# Patient Record
Sex: MALE | Race: WHITE | NOT HISPANIC OR LATINO | Employment: STUDENT | URBAN - METROPOLITAN AREA
[De-identification: names, ages, dates, MRNs, and addresses within clinical notes are randomized per-mention and may not be internally consistent; named-entity substitution may affect disease eponyms.]

---
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
## 2022-02-07 ENCOUNTER — OFFICE VISIT (OUTPATIENT)
Dept: ORTHOPEDIC SURGERY | Facility: CLINIC | Age: 22
End: 2022-02-07

## 2022-02-07 VITALS — WEIGHT: 138 LBS | HEIGHT: 70 IN | BODY MASS INDEX: 19.76 KG/M2 | TEMPERATURE: 97.1 F

## 2022-02-07 DIAGNOSIS — R52 PAIN: ICD-10-CM

## 2022-02-07 DIAGNOSIS — S52.124A CLOSED NONDISPLACED FRACTURE OF HEAD OF RIGHT RADIUS, INITIAL ENCOUNTER: Primary | ICD-10-CM

## 2022-02-07 PROCEDURE — 73070 X-RAY EXAM OF ELBOW: CPT | Performed by: ORTHOPAEDIC SURGERY

## 2022-02-07 PROCEDURE — 99204 OFFICE O/P NEW MOD 45 MIN: CPT | Performed by: ORTHOPAEDIC SURGERY

## 2022-02-07 NOTE — PROGRESS NOTES
New Elbow      Patient: Evangelist Howe        YOB: 2000        Chief Complaints: Elbow pain left      History of Present Illness:  This is a 21-year-old right-hand-dominant young man who injured his left elbow snowboarding at [employer] on Saturday.  He was seen in the ER there was placed in a splint told he had a fracture asked to follow-up with us.  No prior history of similar symptoms pain is significant but better in the splint no prior history of similar symptoms he currently works at [employer]        Allergies: No Known Allergies    Medications:   Home Medications:  Current Outpatient Medications on File Prior to Visit   Medication Sig   • ibuprofen (ADVIL,MOTRIN) 400 MG tablet Take 400 mg by mouth.     No current facility-administered medications on file prior to visit.     Current Medications:  Scheduled Meds:  Continuous Infusions:No current facility-administered medications for this visit.    PRN Meds:.    Past Medical History:   Diagnosis Date   • Aortic valve, bicuspid    • Heart murmur         Past Surgical History:   Procedure Laterality Date   • ADENOIDECTOMY     • APPENDECTOMY     • EAR TUBES     • HERNIA REPAIR          Social History     Occupational History   • Not on file   Tobacco Use   • Smoking status: Never Smoker   • Smokeless tobacco: Never Used   Substance and Sexual Activity   • Alcohol use: Defer   • Drug use: Defer   • Sexual activity: Defer      Social History     Social History Narrative   • Not on file      No family history on file.          Review of Systems: 14 point review of systems more for the elbow pain only the remainder negative per the patient  Review of Systems      Physical Exam: 21 y.o. male  General Appearance:    Alert, cooperative, in no acute distress                 There were no vitals filed for this visit.   Patient is alert and read ×3 no acute distress appears her above-listed at height weight and age.  Affect is normal respiratory rate is  normal unlabored. Heart rate regular rate rhythm, sclera, dentition and hearing are normal for the purpose of this exam.        Ortho Exam exam the left elbow he does have some swelling around there is palpable tenderness laterally he is pain with extension only gets to about -20 degrees of extension flexion is to 90           Radiology:   AP, Lateral of the left elbow were ordered/reviewed to evaluate pain.  I have compared to the views that they did at the hospital which were bit odd he does have an intra-articular effusion with a sail sign has appears he appears to have a nondisplaced radial neck fracture growth plates are closed      Assessment/Plan:  Left radial neck fracture this is nondisplaced he should do fine I will take him out of his splint but put him in a sling after we can start some gentle range of motion no lifting pushing or pulling I will see back 2 weeks with an x-ray he will be unable to do his job at this time

## 2022-02-24 ENCOUNTER — OFFICE VISIT (OUTPATIENT)
Dept: ORTHOPEDIC SURGERY | Facility: CLINIC | Age: 22
End: 2022-02-24

## 2022-02-24 VITALS — RESPIRATION RATE: 18 BRPM | HEIGHT: 70 IN | WEIGHT: 138.1 LBS | BODY MASS INDEX: 19.77 KG/M2 | TEMPERATURE: 97.1 F

## 2022-02-24 DIAGNOSIS — S52.124A CLOSED NONDISPLACED FRACTURE OF HEAD OF RIGHT RADIUS, INITIAL ENCOUNTER: Primary | ICD-10-CM

## 2022-02-24 PROCEDURE — 73070 X-RAY EXAM OF ELBOW: CPT | Performed by: ORTHOPAEDIC SURGERY

## 2022-02-24 PROCEDURE — 99212 OFFICE O/P EST SF 10 MIN: CPT | Performed by: ORTHOPAEDIC SURGERY

## 2022-02-24 NOTE — PROGRESS NOTES
Patient: Evangelist Howe  YOB: 2000  Date of Service: 2/24/2022    Chief Complaints: Left    Subjective:    History of Present Illness: Pt is seen in the office today with complaints of .  left elbow he is status post a snowboarding injury and had a nondisplaced radial neck fracture is been in a sling states her symptoms are much better      Allergies: No Known Allergies    Medications:   Home Medications:  Current Outpatient Medications on File Prior to Visit   Medication Sig   • ibuprofen (ADVIL,MOTRIN) 400 MG tablet Take 400 mg by mouth.     No current facility-administered medications on file prior to visit.     Current Medications:  Scheduled Meds:  Continuous Infusions:No current facility-administered medications for this visit.    PRN Meds:.    I have reviewed the patient's medical history in detail and updated the computerized patient record.  Review and summarization of old records include:    Past Medical History:   Diagnosis Date   • Aortic valve, bicuspid    • Heart murmur         Past Surgical History:   Procedure Laterality Date   • ADENOIDECTOMY     • APPENDECTOMY     • EAR TUBES     • HERNIA REPAIR          Social History     Occupational History   • Not on file   Tobacco Use   • Smoking status: Never Smoker   • Smokeless tobacco: Never Used   Substance and Sexual Activity   • Alcohol use: Defer   • Drug use: Defer   • Sexual activity: Defer      Social History     Social History Narrative   • Not on file      No family history on file.    ROS: 14 point review of systems was performed and was negative except for documented findings in HPI and today's encounter.     Allergies: No Known Allergies  Constitutional:  Denies fever, shaking or chills   Eyes:  Denies change in visual acuity   HENT:  Denies nasal congestion or sore throat   Respiratory:  Denies cough or shortness of breath   Cardiovascular:  Denies chest pain or severe LE edema   GI:  Denies abdominal pain, nausea, vomiting,  bloody stools or diarrhea   Musculoskeletal:  Numbness, tingling, or loss of motor function only as noted above in history of present illness.  : Denies painful urination or hematuria  Integument:  Denies rash, lesion or ulceration   Neurologic:  Denies headache or focal weakness  Endocrine:  Denies lymphadenopathy  Psych:  Denies confusion or change in mental status   Hem:  Denies active bleeding      Physical Exam: 21 y.o. male  Wt Readings from Last 3 Encounters:   02/07/22 62.6 kg (138 lb) (25 %, Z= -0.68)*   07/19/20 61.2 kg (135 lb) (31 %, Z= -0.50)*   08/08/17 50.2 kg (110 lb 9.6 oz) (32 %, Z= -0.46)*     * Growth percentiles are based on CDC (Boys, 2-20 Years) data.       There is no height or weight on file to calculate BMI.  No height and weight on file for this encounter.  There were no vitals filed for this visit.  Vital signs reviewed.   General Appearance:    Alert, cooperative, in no acute distress                    Ortho exam  Exam of his left elbow he has no effusion no redness he has mild loss of extension of about 8 degrees       ap/lat l elbow: These x-rays were taken to evaluate his fracture and compared to previous films he does appear to have a nondisplaced radial neck fracture is in perfect alignment  .time    Assessment: Left radial neck fracture    Plan: Plan is to start active range of motion no lifting pushing or pulling I will see him back in 3 weeks with 1 last x-ray I will keep him off work at least for 3 more weeks  Follow up as indicated.  Ice, elevate, and rest as needed.  Discussed conservative measures of pain control including ice, bracing.  Also talked about the importance of strengthening and maintaining ideal body weight    Odette Schaffer M.D.

## 2022-03-15 NOTE — PROGRESS NOTES
Elbow Follow Up      Patient: Evangelist Howe        YOB: 2000            Chief Complaints: elbow pain left      History of Present Illness: Patient is here for follow-up of the left radial head fracture he states he is doing great he is actually returned to work not having any issues at all      Physical Exam: 21 y.o. male  General Appearance:    Alert, cooperative, in no acute distress                 There were no vitals filed for this visit.     Patient is alert and read ×3 no acute distress appears her above-listed at height weight and age.  Affect is normal respiratory rate is normal unlabored. Heart rate regular rate rhythm, sclera, dentition and hearing are normal for the purpose of this exam.        Ortho Exam exam of his left elbow he has full range of motion no palpable tenderness    X-rays AP and lateral left elbow were taken to evaluate his fracture and compared to previous films he has a healing/healed radial head fracture that is essentially nondisplaced    Procedures          Assessment/Plan: Left radial head fracture that is healing just fine he is asymptomatic he will continue progression of his activities as long as he is doing well he can follow-up as needed

## 2022-03-17 ENCOUNTER — OFFICE VISIT (OUTPATIENT)
Dept: ORTHOPEDIC SURGERY | Facility: CLINIC | Age: 22
End: 2022-03-17

## 2022-03-17 VITALS — BODY MASS INDEX: 19.33 KG/M2 | HEIGHT: 70 IN | WEIGHT: 135 LBS | TEMPERATURE: 97.5 F

## 2022-03-17 DIAGNOSIS — M25.522 LEFT ELBOW PAIN: Primary | ICD-10-CM

## 2022-03-17 PROCEDURE — 99212 OFFICE O/P EST SF 10 MIN: CPT | Performed by: ORTHOPAEDIC SURGERY

## 2022-03-17 PROCEDURE — 73070 X-RAY EXAM OF ELBOW: CPT | Performed by: ORTHOPAEDIC SURGERY

## 2022-04-12 ENCOUNTER — TELEPHONE (OUTPATIENT)
Dept: ORTHOPEDIC SURGERY | Facility: CLINIC | Age: 22
End: 2022-04-12

## 2022-04-12 NOTE — TELEPHONE ENCOUNTER
Patient came in today wanting records or a letter from you stating he is good to go or can go ahead and start the .    I will mail out if I get the okay from you.    Please advise.    Best # for patient is [phone]

## 2025-01-19 ENCOUNTER — HOSPITAL ENCOUNTER (OUTPATIENT)
Facility: HOSPITAL | Age: 25
Discharge: HOME | End: 2025-01-19
Attending: EMERGENCY MEDICINE | Admitting: EMERGENCY MEDICINE
Payer: OTHER GOVERNMENT

## 2025-01-19 ENCOUNTER — APPOINTMENT (OUTPATIENT)
Dept: GENERAL RADIOLOGY | Facility: HOSPITAL | Age: 25
End: 2025-01-19
Payer: OTHER GOVERNMENT

## 2025-01-19 VITALS
BODY MASS INDEX: 21.47 KG/M2 | OXYGEN SATURATION: 100 % | WEIGHT: 150 LBS | HEART RATE: 86 BPM | TEMPERATURE: 97.9 F | SYSTOLIC BLOOD PRESSURE: 152 MMHG | DIASTOLIC BLOOD PRESSURE: 69 MMHG | RESPIRATION RATE: 18 BRPM | HEIGHT: 70 IN

## 2025-01-19 DIAGNOSIS — S09.92XA NOSE INJURY, INITIAL ENCOUNTER: Primary | ICD-10-CM

## 2025-01-19 PROCEDURE — 70160 X-RAY EXAM OF NASAL BONES: CPT

## 2025-01-19 PROCEDURE — G0463 HOSPITAL OUTPT CLINIC VISIT: HCPCS

## 2025-01-19 NOTE — DISCHARGE INSTRUCTIONS
Recommend regular ice and ibuprofen for swelling and pain.  May alternate with Tylenol as needed.  Continue to monitor symptoms, and return to emergency department for worsening symptoms or other medical emergencies.  Recommended follow-up with PCP.

## 2025-01-19 NOTE — FSED PROVIDER NOTE
"Subjective   History of Present Illness  Patient is a 24-year-old male who presents to the emergency department for a nose injury that occurred last night.  Patient reports he was doing a \"TikTok trend\" that involved a flipped like maneuver.  He landed on his face.  Patient reports he had a nosebleed for about 20 to 30 minutes at the time.  Has not had any bleeding since.  Reports some increased bruising and swelling this morning.  Wondering if he broke his nose.  Patient denies any other injury.  Denies loss of consciousness, vomiting.  Reports he otherwise feels well.        Review of Systems   Constitutional:  Negative for chills, diaphoresis, fatigue and fever.   HENT:  Positive for facial swelling and nosebleeds (Resolved). Negative for ear discharge, ear pain and hearing loss.    Eyes:  Negative for pain and visual disturbance.   Respiratory:  Negative for cough and shortness of breath.    Cardiovascular:  Negative for chest pain and palpitations.   Gastrointestinal:  Negative for abdominal pain, nausea and vomiting.   Musculoskeletal:  Negative for gait problem, joint swelling, myalgias, neck pain and neck stiffness.   Skin:  Positive for color change (Bruising). Negative for pallor, rash and wound.   Neurological:  Negative for dizziness, seizures, syncope, weakness, numbness and headaches.   Psychiatric/Behavioral:  Negative for confusion and suicidal ideas. The patient is not nervous/anxious.        Past Medical History:   Diagnosis Date    Acquired deviated nasal septum     Aortic disorder     Aortic bicuspid valve    Aortic valve, bicuspid     Heart murmur     History of placement of ear tubes        No Known Allergies    Past Surgical History:   Procedure Laterality Date    ADENOIDECTOMY      APPENDECTOMY      EAR TUBES      HERNIA REPAIR         History reviewed. No pertinent family history.    Social History     Socioeconomic History    Marital status: Single   Tobacco Use    Smoking status: Never     " Passive exposure: Current    Smokeless tobacco: Never   Vaping Use    Vaping status: Every Day    Substances: Nicotine    Devices: Disposable   Substance and Sexual Activity    Alcohol use: Not Currently     Comment: Occassionally    Drug use: Defer    Sexual activity: Defer           Objective   Physical Exam  Constitutional:       General: He is not in acute distress.     Appearance: Normal appearance. He is normal weight. He is not ill-appearing, toxic-appearing or diaphoretic.   HENT:      Head: Normocephalic and atraumatic.      Comments: Tenderness localized to bridge of nose with some mild swelling and bruising.  No other tenderness of face or scalp.     Right Ear: Tympanic membrane, ear canal and external ear normal.      Left Ear: Tympanic membrane, ear canal and external ear normal.      Nose: Nose normal. No congestion.      Comments: No septal hematoma.  No bleeding.     Mouth/Throat:      Mouth: Mucous membranes are moist.      Pharynx: Oropharynx is clear. No oropharyngeal exudate or posterior oropharyngeal erythema.   Eyes:      Extraocular Movements: Extraocular movements intact.      Conjunctiva/sclera: Conjunctivae normal.      Pupils: Pupils are equal, round, and reactive to light.   Pulmonary:      Effort: Pulmonary effort is normal. No respiratory distress.   Musculoskeletal:         General: Normal range of motion.      Cervical back: Normal range of motion. No rigidity.   Skin:     General: Skin is warm and dry.      Coloration: Skin is not pale.      Findings: Bruising present. No erythema, lesion or rash.   Neurological:      General: No focal deficit present.      Mental Status: He is alert and oriented to person, place, and time.   Psychiatric:         Mood and Affect: Mood normal.         Behavior: Behavior normal.         Procedures           ED Course  ED Course as of 01/19/25 1436   Sun Jan 19, 2025   1423 XR NASAL BONES-     INDICATION: Post fall     COMPARISON: None available      IMPRESSION:  No radiographic evidence of a nasal bone fracture.  Visualizable paranasal sinuses are well aerated.   [AS]      ED Course User Index  [AS] Luisa Morley, MANDY                                           Medical Decision Making  Patient is a 24-year-old male who presents for nose injury that occurred last night.  He overall appears well, no acute distress, nontoxic.  Vital signs are WNL.  He has some tenderness, swelling, bruising localized to the nose.  No other complaints.  Nasal bone x-rays do not reveal any fracture.  We discussed benefit of x-ray versus CT.  I do not feel the patient would benefit from a CT at this time, and patient is agreeable.  Recommend regular ice and ibuprofen.  Discussed when to return to the emergency department.  Answered all questions.  Patient verbalized understanding and was agreeable to plan and discharge.    My differential diagnosis includes was not went to: Fracture, dislocation, sprain, strain, concussion, septal hematoma, epistaxis, intracranial hemorrhage    Problems Addressed:  Nose injury, initial encounter: complicated acute illness or injury    Amount and/or Complexity of Data Reviewed  Radiology: ordered.        Final diagnoses:   Nose injury, initial encounter       ED Disposition  ED Disposition       ED Disposition   Discharge    Condition   Stable    Comment   --               Quyen Segundo MD  [facility]– [place][note 2]7 [address] [address] [phone]               Medication List      No changes were made to your prescriptions during this visit.